# Patient Record
Sex: FEMALE | Race: WHITE | Employment: UNEMPLOYED | URBAN - NONMETROPOLITAN AREA
[De-identification: names, ages, dates, MRNs, and addresses within clinical notes are randomized per-mention and may not be internally consistent; named-entity substitution may affect disease eponyms.]

---

## 2017-01-11 ENCOUNTER — OFFICE VISIT (OUTPATIENT)
Dept: OPTOMETRY | Age: 57
End: 2017-01-11

## 2017-01-11 DIAGNOSIS — H52.203 HYPEROPIA OF BOTH EYES WITH ASTIGMATISM AND PRESBYOPIA: Primary | ICD-10-CM

## 2017-01-11 DIAGNOSIS — H52.03 HYPEROPIA OF BOTH EYES WITH ASTIGMATISM AND PRESBYOPIA: Primary | ICD-10-CM

## 2017-01-11 DIAGNOSIS — H52.4 HYPEROPIA OF BOTH EYES WITH ASTIGMATISM AND PRESBYOPIA: Primary | ICD-10-CM

## 2017-01-11 PROCEDURE — 92014 COMPRE OPH EXAM EST PT 1/>: CPT | Performed by: OPTOMETRIST

## 2017-01-11 ASSESSMENT — SLIT LAMP EXAM - LIDS
COMMENTS: NORMAL
COMMENTS: NORMAL

## 2017-01-11 ASSESSMENT — REFRACTION_WEARINGRX
OD_AXIS: 145
OS_SPHERE: PLANO
OS_CYLINDER: -0.50
OS_AXIS: 070
OD_SPHERE: PLANO
OD_ADD: +2.50
OS_ADD: +2.50
OD_CYLINDER: -0.50

## 2017-01-11 ASSESSMENT — TONOMETRY
IOP_METHOD: APPLANATION W FLURESS DROP
OS_IOP_MMHG: 17
OD_IOP_MMHG: 17

## 2017-01-11 ASSESSMENT — REFRACTION_MANIFEST
OS_SPHERE: PLANO
OD_CYLINDER: -0.25
OD_AXIS: 145
OD_ADD: +2.75
OD_SPHERE: +0.25
OS_AXIS: 070
OS_ADD: +2.75
OS_CYLINDER: -0.50

## 2017-01-11 ASSESSMENT — VISUAL ACUITY
CORRECTION_TYPE: GLASSES
METHOD: SNELLEN - LINEAR
OS_CC: 20/20
OD_CC: 20/20 OU

## 2017-04-27 ENCOUNTER — OFFICE VISIT (OUTPATIENT)
Dept: OPHTHALMOLOGY | Age: 57
End: 2017-04-27
Payer: COMMERCIAL

## 2017-04-27 DIAGNOSIS — Z79.899 HIGH RISK MEDICATION USE: Primary | ICD-10-CM

## 2017-04-27 PROCEDURE — 92083 EXTENDED VISUAL FIELD XM: CPT | Performed by: OPHTHALMOLOGY

## 2017-05-11 ENCOUNTER — OFFICE VISIT (OUTPATIENT)
Dept: OPHTHALMOLOGY | Age: 57
End: 2017-05-11
Payer: COMMERCIAL

## 2017-05-11 DIAGNOSIS — M06.9 RHEUMATOID ARTHRITIS, INVOLVING UNSPECIFIED SITE, UNSPECIFIED RHEUMATOID FACTOR PRESENCE: Primary | ICD-10-CM

## 2017-05-11 DIAGNOSIS — H25.9 SENILE CATARACT, UNSPECIFIED: ICD-10-CM

## 2017-05-11 DIAGNOSIS — Z79.899 HIGH RISK MEDICATION USE: ICD-10-CM

## 2017-05-11 PROCEDURE — 99214 OFFICE O/P EST MOD 30 MIN: CPT | Performed by: OPHTHALMOLOGY

## 2017-05-11 RX ORDER — TROPICAMIDE 10 MG/ML
1 SOLUTION/ DROPS OPHTHALMIC ONCE
Status: COMPLETED | OUTPATIENT
Start: 2017-05-11 | End: 2017-05-11

## 2017-05-11 RX ORDER — BENOXINATE HCL/FLUORESCEIN SOD 0.4%-0.25%
1 DROPS OPHTHALMIC (EYE) ONCE
Status: COMPLETED | OUTPATIENT
Start: 2017-05-11 | End: 2017-05-11

## 2017-05-11 RX ORDER — PHENYLEPHRINE HCL 2.5 %
1 DROPS OPHTHALMIC (EYE) ONCE
Status: COMPLETED | OUTPATIENT
Start: 2017-05-11 | End: 2017-05-11

## 2017-05-11 RX ADMIN — Medication 1 DROP: at 14:28

## 2017-05-11 RX ADMIN — TROPICAMIDE 1 DROP: 10 SOLUTION/ DROPS OPHTHALMIC at 14:28

## 2017-05-11 ASSESSMENT — TONOMETRY
IOP_METHOD: APPLANATION W FLURESS DROP
OS_IOP_MMHG: 15
OD_IOP_MMHG: 14

## 2017-05-11 ASSESSMENT — ENCOUNTER SYMPTOMS
RESPIRATORY NEGATIVE: 1
GASTROINTESTINAL NEGATIVE: 1

## 2017-05-11 ASSESSMENT — CONF VISUAL FIELD
OD_NORMAL: 1
OS_NORMAL: 1
METHOD: COUNTING FINGERS

## 2017-05-11 ASSESSMENT — VISUAL ACUITY
CORRECTION_TYPE: GLASSES
METHOD: SNELLEN - LINEAR
OS_CC: 20/20

## 2017-05-11 ASSESSMENT — SLIT LAMP EXAM - LIDS
COMMENTS: NORMAL
COMMENTS: NORMAL

## 2018-02-07 ENCOUNTER — OFFICE VISIT (OUTPATIENT)
Dept: OPTOMETRY | Age: 58
End: 2018-02-07
Payer: COMMERCIAL

## 2018-02-07 DIAGNOSIS — H52.4 HYPEROPIA OF BOTH EYES WITH ASTIGMATISM AND PRESBYOPIA: Primary | ICD-10-CM

## 2018-02-07 DIAGNOSIS — H52.203 HYPEROPIA OF BOTH EYES WITH ASTIGMATISM AND PRESBYOPIA: Primary | ICD-10-CM

## 2018-02-07 DIAGNOSIS — H52.03 HYPEROPIA OF BOTH EYES WITH ASTIGMATISM AND PRESBYOPIA: Primary | ICD-10-CM

## 2018-02-07 PROCEDURE — 92014 COMPRE OPH EXAM EST PT 1/>: CPT | Performed by: OPTOMETRIST

## 2018-02-07 RX ORDER — BENOXINATE HCL/FLUORESCEIN SOD 0.4%-0.25%
1 DROPS OPHTHALMIC (EYE) ONCE
Status: COMPLETED | OUTPATIENT
Start: 2018-02-07 | End: 2018-02-07

## 2018-02-07 RX ORDER — NABUMETONE 750 MG/1
TABLET, FILM COATED ORAL
COMMUNITY
Start: 2017-11-28

## 2018-02-07 RX ORDER — TIZANIDINE 4 MG/1
4 TABLET ORAL EVERY 6 HOURS PRN
COMMUNITY

## 2018-02-07 RX ADMIN — Medication 1 DROP: at 14:04

## 2018-02-07 ASSESSMENT — REFRACTION_WEARINGRX
OD_SPHERE: +0.25
OS_CYLINDER: +0.50
OS_AXIS: 070
OD_AXIS: 145
OS_SPHERE: PLANO
OD_CYLINDER: -0.25
SPECS_TYPE: PAL

## 2018-02-07 ASSESSMENT — REFRACTION_MANIFEST
OD_SPHERE: +0.50
OD_CYLINDER: DS
OS_CYLINDER: -0.25
OS_AXIS: 088
OS_SPHERE: +0.25
METHOD_AUTOREFRACTION: 1
OD_ADD: +2.75
OS_ADD: +2.75

## 2018-02-07 ASSESSMENT — VISUAL ACUITY
METHOD: SNELLEN - LINEAR
CORRECTION_TYPE: GLASSES
OS_CC+: -1
OS_CC: 20/25

## 2018-02-07 ASSESSMENT — TONOMETRY
OS_IOP_MMHG: 14
IOP_METHOD: APPLANATION W FLURESS DROP
OD_IOP_MMHG: 14

## 2018-02-07 ASSESSMENT — SLIT LAMP EXAM - LIDS
COMMENTS: NORMAL
COMMENTS: NORMAL

## 2018-02-07 NOTE — PROGRESS NOTES
Corinne S Rankin presents today for   Chief Complaint   Patient presents with    Blurred Vision    Vision Exam   .    HPI     Blurred Vision   In both eyes. Vision is blurred. Context:  distance vision. Comments   Last Vision Exam: 1/11/17  Last Ophthalmology Exam:None   Last Filled Glasses Rx: 1/11/17  Insurance: Yoanabbi Devan  Update: Glasses  Patient c/o blurred vision , sees clearer TV without her glasses  No longer on plaquenil                      Main Ophthalmology Exam     Slit Lamp Exam       Right Left    Lids/Lashes Normal Normal    Conjunctiva/Sclera White and quiet White and quiet    Cornea Clear Clear    Anterior Chamber Deep and quiet Deep and quiet    Iris Round and reactive Round and reactive    Lens Clear Clear    Vitreous Normal Normal          Fundus Exam       Right Left    Disc Normal Normal    C/D Ratio 0.25-.2 0.25-.2    Macula Normal Normal    Vessels Normal Normal    78 diopter                    Tonometry     Tonometry (Applanation w Fluress drop, 2:14 PM)       Right Left    Pressure 14 14               Visual Acuity (Snellen - Linear)       Right Left    Dist cc 20/30 20/25 -1    Correction:  Glasses         Not recorded          Ophthalmology Exam     Wearing Rx       Sphere Cylinder Axis Add    Right +0.25 -0.25 145     Left [place] +0.50 070     Age:  1yr    Type:  PAL          Wearing Rx #2       Sphere Cylinder Axis Add    Right +0.25 -0.25 145 +2.75    Left [place] +0.50 070 +2.75    Age:  1yr    Type:  PAL                Manifest Refraction     Manifest Refraction (Auto)       Sphere Cylinder Axis Dist [place] Add    Right +0.50 ds  20/20 +2.75    Left +0.25 -0.25 088 20/20 +2.75          Manifest Refraction #2       Sphere Cylinder Axis Dist VA Add    Right +0.75 -0.50 134      Left +0.50 -0.25 088                 Final Rx       Sphere Cylinder Axis Add    Right +0.50 ds  +2.75    Left +0.25 -0.25 088 +2.75    Type:  PAL    Expiration Date:  2/8/2020            1.  Hyperopia of both eyes with astigmatism and presbyopia           Patient Instructions   New glasses recommended      Return in about 2 years (around 2/7/2020).

## 2020-01-08 ENCOUNTER — OFFICE VISIT (OUTPATIENT)
Dept: OPTOMETRY | Age: 60
End: 2020-01-08
Payer: COMMERCIAL

## 2020-01-08 PROCEDURE — 92014 COMPRE OPH EXAM EST PT 1/>: CPT | Performed by: OPTOMETRIST

## 2020-01-08 ASSESSMENT — KERATOMETRY
OS_K2POWER_DIOPTERS: 43.00
OD_AXISANGLE_DEGREES: 059
OS_AXISANGLE_DEGREES: 144
OD_AXISANGLE2_DEGREES: 149
OD_K1POWER_DIOPTERS: 42.75
OS_K1POWER_DIOPTERS: 42.75
OS_AXISANGLE2_DEGREES: 054
OD_K2POWER_DIOPTERS: 43.50

## 2020-01-08 ASSESSMENT — REFRACTION_MANIFEST
OS_SPHERE: +0.75
OS_CYLINDER: -0.50
OD_AXIS: 110
OD_CYLINDER: -0.50
OD_ADD: +2.75
OD_SPHERE: +1.00
OS_AXIS: 080
OS_ADD: +2.75

## 2020-01-08 ASSESSMENT — REFRACTION_WEARINGRX
OD_ADD: +2.75
OD_SPHERE: +0.50
SPECS_TYPE: PAL
OS_SPHERE: +0.25
OS_CYLINDER: -0.25
OS_ADD: +2.75
OS_AXIS: 088
OD_CYLINDER: DS

## 2020-01-08 ASSESSMENT — VISUAL ACUITY
OD_CC: 20/25 OU
OS_CC+: -2
OS_CC: 20/20
METHOD: SNELLEN - LINEAR
CORRECTION_TYPE: GLASSES

## 2020-01-08 ASSESSMENT — TONOMETRY
OD_IOP_MMHG: 15
OS_IOP_MMHG: 18
IOP_METHOD: NON-CONTACT AIR PUFF

## 2020-01-08 ASSESSMENT — SLIT LAMP EXAM - LIDS
COMMENTS: NORMAL
COMMENTS: NORMAL

## 2020-03-20 NOTE — PROGRESS NOTES
None     Inability: None    Transportation needs:     Medical: None     Non-medical: None   Tobacco Use    Smoking status: Former Smoker     Last attempt to quit: 10/6/2002     Years since quittin.2    Smokeless tobacco: Never Used   Substance and Sexual Activity    Alcohol use: No    Drug use: No    Sexual activity: None   Lifestyle    Physical activity:     Days per week: None     Minutes per session: None    Stress: None   Relationships    Social connections:     Talks on phone: None     Gets together: None     Attends Gnosticist service: None     Active member of club or organization: None     Attends meetings of clubs or organizations: None     Relationship status: None    Intimate partner violence:     Fear of current or ex partner: None     Emotionally abused: None     Physically abused: None     Forced sexual activity: None   Other Topics Concern    None   Social History Narrative    None     Past Medical History:   Diagnosis Date    Abnormal weight loss     Allergic rhinitis     Backache     Benign colonic polyp     Benign neoplasm of bone     Carpal tunnel syndrome     Fatigue     Ganglion of joint     Hemorrhage of gastrointestinal tract, unspecified     Hyperlipidemia     Insomnia     Menopausal symptoms     Myopia with astigmatism and presbyopia     (Right eye).  Myopia with presbyopia     (simple myopia) - left eye.  Osteoarthrosis, unspecified whether generalized or localized, forearm     Other benign neoplasm of connective and other soft tissue of unspecified site     Other malaise and fatigue     Palpitations     Postmenopausal atrophic vaginitis     Primary localized osteoarthrosis, hand     Rheumatoid arthritis ([address]     (on Plaquenil therapy).     Rheumatoid arthritis(714.0)     Rotator cuff tendonitis     Toe pain            Main Ophthalmology Exam     External Exam       Right Left    External Normal Normal          Slit Lamp Exam       Right Left Attending

## 2021-11-17 ENCOUNTER — OFFICE VISIT (OUTPATIENT)
Dept: OPTOMETRY | Age: 61
End: 2021-11-17
Payer: COMMERCIAL

## 2021-11-17 DIAGNOSIS — H52.03 HYPEROPIA OF BOTH EYES WITH ASTIGMATISM AND PRESBYOPIA: Primary | ICD-10-CM

## 2021-11-17 DIAGNOSIS — H25.813 COMBINED FORMS OF AGE-RELATED CATARACT OF BOTH EYES: ICD-10-CM

## 2021-11-17 DIAGNOSIS — H52.4 HYPEROPIA OF BOTH EYES WITH ASTIGMATISM AND PRESBYOPIA: Primary | ICD-10-CM

## 2021-11-17 DIAGNOSIS — H52.203 HYPEROPIA OF BOTH EYES WITH ASTIGMATISM AND PRESBYOPIA: Primary | ICD-10-CM

## 2021-11-17 PROCEDURE — 92014 COMPRE OPH EXAM EST PT 1/>: CPT | Performed by: OPTOMETRIST

## 2021-11-17 ASSESSMENT — REFRACTION_WEARINGRX
SPECS_TYPE: PAL
OS_CYLINDER: -0.50
OD_AXIS: 110
OD_CYLINDER: -0.50
OD_ADD: +2.75
OD_SPHERE: +1.00
OS_SPHERE: +0.75
OS_AXIS: 080
OS_ADD: +2.75

## 2021-11-17 ASSESSMENT — KERATOMETRY
OD_AXISANGLE_DEGREES: 081
METHOD_AUTO_MANUAL: AUTOMATED
OS_AXISANGLE_DEGREES: 096
OS_K2POWER_DIOPTERS: 43.25
OD_K2POWER_DIOPTERS: 43.75
OD_AXISANGLE2_DEGREES: 171
OD_K1POWER_DIOPTERS: 43.00
OS_K1POWER_DIOPTERS: 43.00
OS_AXISANGLE2_DEGREES: 006

## 2021-11-17 ASSESSMENT — REFRACTION_MANIFEST
OD_SPHERE: +0.75
OD_AXIS: 135
OS_AXIS: 091
OS_SPHERE: +0.50
OS_ADD: +2.75
OD_CYLINDER: -0.50
OS_CYLINDER: -0.50
OD_ADD: +2.75

## 2021-11-17 ASSESSMENT — VISUAL ACUITY
CORRECTION_TYPE: GLASSES
METHOD: SNELLEN - LINEAR
OD_CC: 20/20
OS_CC: 20/25

## 2021-11-17 ASSESSMENT — TONOMETRY
OD_IOP_MMHG: 18
IOP_METHOD: NON-CONTACT AIR PUFF
OS_IOP_MMHG: 16

## 2021-11-17 ASSESSMENT — ENCOUNTER SYMPTOMS
GASTROINTESTINAL NEGATIVE: 0
RESPIRATORY NEGATIVE: 0
ALLERGIC/IMMUNOLOGIC NEGATIVE: 0
EYES NEGATIVE: 0

## 2021-11-17 ASSESSMENT — SLIT LAMP EXAM - LIDS
COMMENTS: NORMAL
COMMENTS: NORMAL

## 2021-11-17 NOTE — PROGRESS NOTES
Korina Hensley presents today for   Chief Complaint   Patient presents with    Vision Exam   .    HPI     Last Vision Exam: 20   Last Ophthalmology Exam: n/a  Last Filled Glasses Rx: 20  Insurance: Madhuri Duran   Update: Update glasses   Some changes in the vision   Distance and near          Current Outpatient Medications   Medication Sig Dispense Refill    nabumetone (RELAFEN) 750 MG tablet Two BID      tiZANidine (ZANAFLEX) 4 MG tablet Take 4 mg by mouth every 6 hours as needed      PREMARIN 0.625 MG tablet       calcium carbonate-vitamin D (CALCIUM 600+D) 600-200 MG-UNIT TABS Take  by mouth.  fluticasone (FLONASE) 50 MCG/ACT nasal spray 1 spray by Nasal route 2 times daily.  clotrimazole-betamethasone (LOTRISONE) cream Apply and rub in a thin film topically to affected areas 2 times daily. (AM and PM).  prenatal vitamin (VOL-PLUS) 27-1 MG TABS TABLET Take 1 tablet by mouth daily.  pravastatin (PRAVACHOL) 20 MG tablet Take 20 mg by mouth nightly.  traZODone (DESYREL) 150 MG tablet       Estrogens Conjugated (PREMARIN PO) Take 0.625 mg by mouth daily. As directed.        Current Facility-Administered Medications   Medication Dose Route Frequency Provider Last Rate Last Admin    tropicamide (MYDRIACYL) 1 % ophthalmic solution 1 drop  1 drop Both Eyes Once Myrna Alcantara MD             Family History   Problem Relation Age of Onset    Diabetes Other     Breast Cancer Other     Cervical Cancer Other     Heart Disease Other     Cataracts Mother     Diabetes Paternal Grandfather     Glaucoma Neg Hx      Social History     Socioeconomic History    Marital status:      Spouse name: None    Number of children: None    Years of education: None    Highest education level: None   Occupational History    None   Tobacco Use    Smoking status: Former Smoker     Quit date: 10/6/2002     Years since quittin.1    Smokeless tobacco: Never Used   Substance and Sexual Activity    Alcohol use: No    Drug use: No    Sexual activity: None   Other Topics Concern    None   Social History Narrative    None     Social Determinants of Health     Financial Resource Strain:     Difficulty of Paying Living Expenses: Not on file   Food Insecurity:     Worried About Running Out of Food in the Last Year: Not on file    [place] of Food in the Last Year: Not on file   Transportation Needs:     Lack of Transportation (Medical): Not on file    Lack of Transportation (Non-Medical): Not on file   Physical Activity:     Days of Exercise per Week: Not on file    Minutes of Exercise per Session: Not on file   Stress:     Feeling of Stress : Not on file   Social Connections:     Frequency of Communication with Friends and Family: Not on file    Frequency of Social Gatherings with Friends and Family: Not on file    Attends Islam Services: Not on file    Active Member of [address] [place] [address] [employer] or Organizations: Not on file    Attends Club or Organization Meetings: Not on file    Marital Status: Not on file   Intimate Partner Violence:     Fear of Current or Ex-Partner: Not on file    Emotionally Abused: Not on file    Physically Abused: Not on file    Sexually Abused: Not on file   Housing Stability:     Unable to Pay for Housing in the Last Year: Not on file    Number of [place] in the Last Year: Not on file    Unstable Housing in the Last Year: Not on file     Past Medical History:   Diagnosis Date    Abnormal weight loss     Allergic rhinitis     Backache     Benign colonic polyp     Benign neoplasm of bone     Carpal tunnel syndrome     Fatigue     Ganglion of joint     Hemorrhage of gastrointestinal tract, unspecified     Hyperlipidemia     Insomnia     Menopausal symptoms     Myopia with astigmatism and presbyopia     (Right eye).  Myopia with presbyopia     (simple myopia) - left eye.     Osteoarthrosis, unspecified whether generalized or localized, forearm     Other benign neoplasm of connective and other soft tissue of unspecified site     Other malaise and fatigue     Palpitations     Postmenopausal atrophic vaginitis     Primary localized osteoarthrosis, hand     Rheumatoid arthritis (HCC)     (on Plaquenil therapy).     Rheumatoid arthritis(714.0)     Rotator cuff tendonitis     Toe pain        ROS     Negative for: Constitutional, Gastrointestinal, Neurological, Skin, Genitourinary, Musculoskeletal, HENT, Endocrine, Cardiovascular, Eyes, Respiratory, Psychiatric, Allergic/Imm, Heme/Lymph          Main Ophthalmology Exam     External Exam       Right Left    External Normal Normal          Slit Lamp Exam       Right Left    Lids/Lashes Normal Normal    Conjunctiva/Sclera White and quiet White and quiet    Cornea Clear Clear    Anterior Chamber Deep and quiet Deep and quiet    Iris Round and reactive Round and reactive    Lens Trace Nuclear sclerosis Trace Nuclear sclerosis, Posterior subcapsular cataract    Vitreous Normal Normal          Fundus Exam       Right Left    Disc Normal Normal    C/D Ratio 0.25 0.25    Macula Normal Normal    Vessels Normal Normal             <div id=\"MAIN_EXAM_REVIEWED\"></div>      Tonometry     Tonometry (Non-contact air puff, 3:58 PM)       Right Left    Pressure 18 16      Right / Left  IOPg 16.1 / 14.7  CH 9.2 / 9.8  WS 8.5 / 9.4                   Not recorded       Not recorded         Visual Acuity (Snellen - Linear)       Right Left    Dist cc 20/30 20/25    Near cc 20/20     Correction: Glasses          Pupils     Pupils       Pupils    Right PERRL    Left PERRL              Neuro/Psych     Neuro/Psych     Oriented x3: Yes    Mood/Affect: Normal              Keratometry     Keratometry (Automated)       K1 Axis K2 Axis    Right 43.00 171 43.75 081    Left 43.00 006 43.25 096                  Ophthalmology Exam     Wearing Rx       Sphere Cylinder Axis Add    Right +1.00 -0.50 110 +2.75    Left +0.75 -0.50 080 +2.75    Age: 1yr    Type: PAL              Manifest Refraction     Manifest Refraction       Sphere Cylinder Axis Dist VA Add    Right +0.75 -0.50 135 20/25 +2.75    Left +0.50 -0.50 091 20/25 +2.75          Manifest Refraction #2 (Auto)       Sphere Cylinder Axis Dist VA Add    Right +1.00 -0.75 136      Left +1.00 -0.50 091                 Final Rx       Sphere Cylinder Axis Add    Right +0.75 -0.50 135 +2.75    Left +0.50 -0.50 091 +2.75    Type: PAL    Expiration Date: 11/18/2023            No orders of the defined types were placed in this encounter. IMPRESSION:  1. Hyperopia of both eyes with astigmatism and presbyopia    2. Combined forms of age-related cataract of both eyes        PLAN:    1. New glasses recommended  2. Monitor for future progression and visual significance. Counseled patient that more glare may be noticed and more light may be needed for reading.         Patient Instructions   New glasses recommended      Return in about 2 years (around 11/17/2023) for complete eye exam.